# Patient Record
Sex: FEMALE | Race: WHITE | NOT HISPANIC OR LATINO | Employment: FULL TIME | URBAN - METROPOLITAN AREA
[De-identification: names, ages, dates, MRNs, and addresses within clinical notes are randomized per-mention and may not be internally consistent; named-entity substitution may affect disease eponyms.]

---

## 2019-11-06 ENCOUNTER — TRANSCRIBE ORDERS (OUTPATIENT)
Dept: ADMINISTRATIVE | Facility: HOSPITAL | Age: 34
End: 2019-11-06

## 2019-11-06 DIAGNOSIS — N64.52 NIPPLE DISCHARGE: Primary | ICD-10-CM

## 2019-11-08 ENCOUNTER — TRANSCRIBE ORDERS (OUTPATIENT)
Dept: ADMINISTRATIVE | Facility: HOSPITAL | Age: 34
End: 2019-11-08

## 2019-11-08 DIAGNOSIS — N64.52 NIPPLE DISCHARGE: Primary | ICD-10-CM

## 2019-11-13 ENCOUNTER — HOSPITAL ENCOUNTER (OUTPATIENT)
Dept: ULTRASOUND IMAGING | Facility: HOSPITAL | Age: 34
Discharge: HOME | End: 2019-11-13

## 2019-11-13 ENCOUNTER — HOSPITAL ENCOUNTER (OUTPATIENT)
Dept: MAMMOGRAPHY | Facility: HOSPITAL | Age: 34
Discharge: HOME | End: 2019-11-13
Admitting: SURGERY

## 2019-11-13 ENCOUNTER — TRANSCRIBE ORDERS (OUTPATIENT)
Dept: MAMMOGRAPHY | Facility: HOSPITAL | Age: 34
End: 2019-11-13

## 2019-11-13 DIAGNOSIS — N64.52 NIPPLE DISCHARGE: ICD-10-CM

## 2019-11-13 DIAGNOSIS — R92.8 ABNORMAL MAMMOGRAM: Primary | ICD-10-CM

## 2019-11-13 PROCEDURE — 77062 BREAST TOMOSYNTHESIS BI: CPT | Performed by: RADIOLOGY

## 2019-11-13 PROCEDURE — 76642 ULTRASOUND BREAST LIMITED: CPT | Performed by: RADIOLOGY

## 2019-11-13 PROCEDURE — 76642 ULTRASOUND BREAST LIMITED: CPT

## 2019-11-13 PROCEDURE — 77066 DX MAMMO INCL CAD BI: CPT | Performed by: RADIOLOGY

## 2019-11-13 PROCEDURE — 77066 DX MAMMO INCL CAD BI: CPT

## 2019-11-13 PROCEDURE — G0279 TOMOSYNTHESIS, MAMMO: HCPCS

## 2020-06-29 ENCOUNTER — TRANSCRIBE ORDERS (OUTPATIENT)
Dept: ADMINISTRATIVE | Facility: HOSPITAL | Age: 35
End: 2020-06-29

## 2020-06-29 DIAGNOSIS — R10.12 LUQ ABDOMINAL PAIN: Primary | ICD-10-CM

## 2020-07-08 ENCOUNTER — HOSPITAL ENCOUNTER (OUTPATIENT)
Dept: ULTRASOUND IMAGING | Facility: HOSPITAL | Age: 35
Discharge: HOME | End: 2020-07-08
Admitting: STUDENT IN AN ORGANIZED HEALTH CARE EDUCATION/TRAINING PROGRAM

## 2020-07-08 DIAGNOSIS — R10.12 LUQ ABDOMINAL PAIN: ICD-10-CM

## 2020-07-08 PROCEDURE — 76700 US EXAM ABDOM COMPLETE: CPT

## 2020-07-16 ENCOUNTER — TRANSCRIBE ORDERS (OUTPATIENT)
Dept: ADMINISTRATIVE | Facility: HOSPITAL | Age: 35
End: 2020-07-16

## 2020-07-16 DIAGNOSIS — K82.8 SLUDGE IN GALLBLADDER: Primary | ICD-10-CM

## 2020-08-13 ENCOUNTER — HOSPITAL ENCOUNTER (OUTPATIENT)
Dept: NUCLEAR MEDICINE | Facility: HOSPITAL | Age: 35
Discharge: HOME | End: 2020-08-13

## 2020-08-13 VITALS — WEIGHT: 275 LBS

## 2020-08-13 DIAGNOSIS — K82.8 SLUDGE IN GALLBLADDER: ICD-10-CM

## 2020-08-13 PROCEDURE — 25010000002 SINCALIDE PER 5 MCG: Performed by: STUDENT IN AN ORGANIZED HEALTH CARE EDUCATION/TRAINING PROGRAM

## 2020-08-13 PROCEDURE — 0 TECHNETIUM TC 99M MEBROFENIN KIT: Performed by: STUDENT IN AN ORGANIZED HEALTH CARE EDUCATION/TRAINING PROGRAM

## 2020-08-13 PROCEDURE — A9537 TC99M MEBROFENIN: HCPCS | Performed by: STUDENT IN AN ORGANIZED HEALTH CARE EDUCATION/TRAINING PROGRAM

## 2020-08-13 PROCEDURE — 78227 HEPATOBIL SYST IMAGE W/DRUG: CPT

## 2020-08-13 RX ORDER — KIT FOR THE PREPARATION OF TECHNETIUM TC 99M MEBROFENIN 45 MG/10ML
1 INJECTION, POWDER, LYOPHILIZED, FOR SOLUTION INTRAVENOUS
Status: COMPLETED | OUTPATIENT
Start: 2020-08-13 | End: 2020-08-13

## 2020-08-13 RX ADMIN — SINCALIDE 2.5 MCG: 5 INJECTION, POWDER, LYOPHILIZED, FOR SOLUTION INTRAVENOUS at 11:09

## 2020-08-13 RX ADMIN — MEBROFENIN 1 DOSE: 45 INJECTION, POWDER, LYOPHILIZED, FOR SOLUTION INTRAVENOUS at 10:05

## 2021-01-04 ENCOUNTER — TRANSCRIBE ORDERS (OUTPATIENT)
Dept: ADMINISTRATIVE | Facility: HOSPITAL | Age: 36
End: 2021-01-04

## 2021-01-04 DIAGNOSIS — R51.9 SEVERE FRONTAL HEADACHES: Primary | ICD-10-CM

## 2021-01-15 ENCOUNTER — HOSPITAL ENCOUNTER (OUTPATIENT)
Dept: CT IMAGING | Facility: HOSPITAL | Age: 36
Discharge: HOME | End: 2021-01-15
Admitting: NURSE PRACTITIONER

## 2021-01-15 DIAGNOSIS — R51.9 SEVERE FRONTAL HEADACHES: ICD-10-CM

## 2021-01-15 PROCEDURE — 70486 CT MAXILLOFACIAL W/O DYE: CPT

## 2021-09-21 ENCOUNTER — TRANSCRIBE ORDERS (OUTPATIENT)
Dept: ADMINISTRATIVE | Facility: HOSPITAL | Age: 36
End: 2021-09-21

## 2021-09-21 DIAGNOSIS — R10.84 ABDOMINAL CRAMPING, GENERALIZED: Primary | ICD-10-CM

## 2021-10-22 ENCOUNTER — APPOINTMENT (OUTPATIENT)
Dept: ULTRASOUND IMAGING | Facility: HOSPITAL | Age: 36
End: 2021-10-22

## 2021-12-13 ENCOUNTER — HOSPITAL ENCOUNTER (OUTPATIENT)
Dept: ULTRASOUND IMAGING | Facility: HOSPITAL | Age: 36
Discharge: HOME | End: 2021-12-13
Admitting: NURSE PRACTITIONER

## 2021-12-13 DIAGNOSIS — R10.84 ABDOMINAL CRAMPING, GENERALIZED: ICD-10-CM

## 2021-12-13 PROCEDURE — 76700 US EXAM ABDOM COMPLETE: CPT

## 2022-06-13 NOTE — PROGRESS NOTES
[facility]  [address], [address]  [place], [place], [address] [phone]  [email or web address] Francis Hospital [facility]           OUTPATIENT CLINIC CONSULTATION NOTE    Patient care team:  Patient Care Team:  Lv Banerjee APRN as PCP - General (Family Medicine)  Yuri Lowry MD as Consulting Physician (Cardiology)    Requesting Provider and Reason for consultation: The patient is being seen today at the request of Isaiah Mcdaniel MD for palpitations, PVCs.     Subjective:   Chief complaint:   Chief Complaint   Patient presents with   • Palpitations   • PVC'S       HPI:    Lula Armenta is a 37 y.o. female.  Cardiac focused problem list:  1. PVCs  a. Status post RFA, 2012, [place]  b. Followed by [facility] cardiology till 2022  2. Cardiomegaly  3. Bipolar 1 disorder  4. Anxiety  5. Arthritis  6. Psoriasis  7. Overweight    Patient presents today for consultation.     PVCs for years.  Increased palpitations over the last couple months.  Not on prescription medicines for PVCs.  Feels like a strong heartbeat.  Sometimes Apple watch states that her heart rate is in the 40s.  No clear provocative situations.  When she walks her dog, she does not experience symptoms    Generally anxious.  Takes Ativan as needed but once a week.  Has panic attacks.    Possible IBS    Has psoriasis.  Possible psoriatic arthritis.    Does not exercise regularly    Does not get great sleep.  About 6 hours at night.  Woken up by child and cat intermittently.   has YOON on CPAP.  Snores some.  Sometimes well rested    Does not use caffeine, alcohol, tobacco products      Review of Systems:  As noted above in the HPI    PFSH:  Patient Active Problem List   Diagnosis   • PVC's (premature ventricular contractions)   • Anxiety   • Psoriasis         Current Outpatient Medications:   •  clotrimazole (LOTRIMIN) 1 % cream, 1 application As Needed., Disp: , Rfl:   •  Fluocinolone Acetonide Scalp 0.01 % oil, As Needed.,  "Disp: , Rfl:   •  LORazepam (ATIVAN) 1 MG tablet, 1 mg As Needed., Disp: , Rfl:   •  magnesium oxide (MAG-OX) 400 MG tablet, Take 1 tablet by mouth 2 (Two) Times a Day., Disp: 60 tablet, Rfl: 11    Allergies   Allergen Reactions   • Lamotrigine Palpitations   • Methylprednisolone Unknown (See Comments)   • Pseudoephedrine Palpitations       Social History     Socioeconomic History   • Marital status:    Tobacco Use   • Smoking status: Former Smoker     Quit date:      Years since quittin.4   • Smokeless tobacco: Never Used   Vaping Use   • Vaping Use: Never used   Substance and Sexual Activity   • Alcohol use: Never   • Drug use: Defer   • Sexual activity: Defer     Family History   Problem Relation Age of Onset   • No Known Problems Mother    • No Known Problems Father    • Breast cancer Maternal Grandmother 47   • Ovarian cancer Neg Hx          Objective:   Physical Exam:  /78 (BP Location: Left arm, Patient Position: Sitting)   Pulse 77   Ht 170.2 cm (67\")   Wt 127 kg (280 lb 6.4 oz)   SpO2 98%   BMI 43.92 kg/m²   CONSTITUTIONAL: No acute distress  RESPIRATORY: Normal effort. Clear to auscultation bilaterally without wheezing or rales  CARDIOVASCULAR: Regular rate and rhythm with normal S1 and S2. Without murmur.  No carotid bruit bilaterally  PERIPHERAL VASCULAR: Normal radial pulse.       Labs:  Labs reviewed by myself  No results found for: BUN, CREATININE, K, ALT, AST    No results found for: CHOL  No results found for: TRIG  No results found for: HDL  No results found for: LDL  No components found for: LDLDIRECTC    Diagnostic Data:      ECG 12 Lead    Date/Time: 2022 9:15 AM  Performed by: Yuri Lowry MD  Authorized by: Yuri Lowry MD   Previous ECG: no previous ECG available  Rhythm: sinus rhythm            Holter monitor 2021  · THE PREDOMINANT RHYTHM WAS SINUS WITH SINUS BRADYCARDIA AND SINUS   TACHYCARDIA.  RATES RANGE FROM 47/MIN /MIN.   · ATRIAL ECTOPY " INCLUDED RARE PREMATURE ATRIAL COMPLEXES AND ONE FOUR   BEAT ATRIAL RUN ON D2.  THE FASTEST RATE /MIN   · VENTRICULAR ECTOPY INCLUDED RARE PREMATURE VENTRICULAR COMPLEXES,   RARE VENTRICULAR COUPLETS, AND RARE EPISODES OF VENTRICULAR BIGEMINY AND   VENTRICULAR TRIGEMINY.     Holter monitor 1/04/2022  PVC burden 0.79%.  Events associated with isolated ectopy or sinus rhythm    TTE 9/17/2018  Normal RV size with normal RV systolic function.   Borderline dilated LV with normal LV systolic function (LVEF>55%).   No significant valvular stenosis or regurgitation.   No pericardial effusion.   The E/e' ratio is most consistent with 'a normal' left atrial pressure.     TTE 4/13/2021  · The left ventricular end-diastolic volume index (2D biplane method) is dilated for females (>61ml/m2)   · Left ventricular systolic function is normal, with estimated EF > 55%.   · The left ventricular wall motion is normal.   · The right ventricle is mildly dilated.   · The right ventricular systolic function is normal.   · There is trace tricuspid regurgitation.   · Although the spectral Doppler envelope of TR was not adequate for calculating the PAP, the estimated PAP based upon other 2D and Doppler features suggests that the PAP is 'probably normal or at most mildly elevated'.     Assessment and Plan:     Premature ventricular contractions  Anxiety  Poor sleep/sleep disturbances  Possible IBS  Psoriasis  Obesity  -Patient with multiple factors that may be contributing to increased palpitations/PVCs including anxiety, sleep disturbances, possible IBS, psoriasis, lack of cardiac exercise/overweight  -Discussed trying to modify some of those factors.  Discussed a sleep medicine referral if she wishes.  -7-day heart monitor to quantify PVC burden due to increased frequency/symptoms recently  -Trial of magnesium oxide 400 mg twice daily  -Continue Ativan as needed  -Discussed diltiazem as needed, but will hold off on prescription for  now      - Return in about 6 months (around 12/16/2022).    Yuri Lowry MD, MSc, FACC, [facility]  Interventional Cardiology  [facility]

## 2022-06-16 ENCOUNTER — OFFICE VISIT (OUTPATIENT)
Dept: CARDIOLOGY | Facility: CLINIC | Age: 37
End: 2022-06-16

## 2022-06-16 VITALS
BODY MASS INDEX: 44.01 KG/M2 | DIASTOLIC BLOOD PRESSURE: 78 MMHG | OXYGEN SATURATION: 98 % | HEART RATE: 77 BPM | WEIGHT: 280.4 LBS | SYSTOLIC BLOOD PRESSURE: 122 MMHG | HEIGHT: 67 IN

## 2022-06-16 DIAGNOSIS — I49.3 PVC'S (PREMATURE VENTRICULAR CONTRACTIONS): Primary | ICD-10-CM

## 2022-06-16 PROCEDURE — 99204 OFFICE O/P NEW MOD 45 MIN: CPT | Performed by: INTERNAL MEDICINE

## 2022-06-16 PROCEDURE — 93000 ELECTROCARDIOGRAM COMPLETE: CPT | Performed by: INTERNAL MEDICINE

## 2022-06-16 RX ORDER — MAGNESIUM OXIDE 400 MG/1
400 TABLET ORAL 2 TIMES DAILY
Qty: 60 TABLET | Refills: 11
Start: 2022-06-16 | End: 2023-01-16

## 2022-06-16 RX ORDER — FLUOCINOLONE ACETONIDE 0.11 MG/ML
OIL TOPICAL AS NEEDED
COMMUNITY
Start: 2022-04-01 | End: 2023-01-16

## 2022-07-19 ENCOUNTER — TELEPHONE (OUTPATIENT)
Dept: CARDIOLOGY | Facility: CLINIC | Age: 37
End: 2022-07-19

## 2022-07-19 NOTE — TELEPHONE ENCOUNTER
Paula from [facility] was inquiring about patient having congestive heart failure. They would like to treat patient with Humira or Enbrel. They would like cardiology's input about these medications with patient's history of Cardiomegly.  Please Advise

## 2022-07-20 NOTE — TELEPHONE ENCOUNTER
Contacted Paula and gave updates from Dr. Lowry.    Yuri Lowry MD Kincaid, Shannon N, RN  Caller: Unspecified (Yesterday,  9:40 AM)  No definitive history of congestive heart failure.  Just reported cardiomegaly.  Okay to proceed with Humira or Enbrel from my standpoint.  The standard/typical cardiac risk with these medications apply to this patient.

## 2022-08-26 ENCOUNTER — TELEPHONE (OUTPATIENT)
Dept: CARDIOLOGY | Facility: CLINIC | Age: 37
End: 2022-08-26

## 2022-08-26 NOTE — TELEPHONE ENCOUNTER
----- Message from Yuri Lowry MD sent at 8/25/2022  8:06 PM EDT -----  Please let the patient know that her heart monitor looked okay.  No significant arrhythmia.  Rare PVCs.  Patient events associated with sinus rhythm/sinus tachycardia.    ----- Message -----  From: Yuri Lowry MD  Sent: 8/25/2022   6:20 PM EDT  To: Yuri Lowry MD

## 2023-01-16 ENCOUNTER — OFFICE VISIT (OUTPATIENT)
Dept: GASTROENTEROLOGY | Facility: CLINIC | Age: 38
End: 2023-01-16
Payer: COMMERCIAL

## 2023-01-16 VITALS
HEIGHT: 67 IN | DIASTOLIC BLOOD PRESSURE: 84 MMHG | WEIGHT: 278.4 LBS | OXYGEN SATURATION: 97 % | BODY MASS INDEX: 43.7 KG/M2 | HEART RATE: 71 BPM | SYSTOLIC BLOOD PRESSURE: 126 MMHG | TEMPERATURE: 97.1 F

## 2023-01-16 DIAGNOSIS — R74.8 ELEVATED LIVER ENZYMES: Primary | ICD-10-CM

## 2023-01-16 PROCEDURE — 99214 OFFICE O/P EST MOD 30 MIN: CPT | Performed by: NURSE PRACTITIONER

## 2023-01-16 RX ORDER — ADALIMUMAB 40MG/0.4ML
KIT SUBCUTANEOUS
COMMUNITY
Start: 2022-11-29 | End: 2023-01-16

## 2023-01-16 RX ORDER — MEDROXYPROGESTERONE ACETATE 150 MG/ML
1 INJECTION, SUSPENSION INTRAMUSCULAR
COMMUNITY
Start: 2023-01-11

## 2023-01-16 RX ORDER — PREGABALIN 75 MG/1
CAPSULE ORAL
COMMUNITY
Start: 2023-01-04 | End: 2023-01-16

## 2023-01-16 NOTE — PROGRESS NOTES
GASTROENTEROLOGY OFFICE NOTE    Lula Armenta  [id number]  1985    CARE TEAM  Patient Care Team:  Lv Banerjee APRN as PCP - General (Family Medicine)  Yuri Lowry MD as Consulting Physician (Cardiology)    Referring Provider: Michelle Dunn APRN    Chief Complaint   Patient presents with   • Elevated Hepatic Enzymes        HISTORY OF PRESENT ILLNESS:   Lula Armenta is a 37 y.o. female who presents to the clinic today as a referral from Aditi ZELAYA for evaluation regarding elevated liver enzymes.  [facility] is [facility] at [facility] due to joint pain, arthritis with history of taking Humira, previously been prescribed meloxicam.  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      Review of referral documentation revealed 7/2022 she had positive EDWARD 1-160 speckled pattern.    7/18/2022 CMP with normal ALT of 25, normal AST 18 and bilirubin less than 0.2, alkaline phosphatase normal 98.  Acute hepatitis panel negative    12/5/2022 TSH normal, sed rate normal 13, CMP with elevated  and elevated AST 64.  Bilirubin normal 0.3 alkaline phosphatase normal 90.  CBC normal white blood cell count 10.1, hemoglobin normal 13.3, hematocrit normal 40.7 and platelet count normal 306.    3/28/2022 CMP revealed normal liver enzymes.    12/13/2021 ultrasound of the abdomen Due to abdominal pain, nausea, vomiting revealed increased echogenicity throughout the hepatic parenchyma apart from focal fatty sparing, no focal liver lesion.    An ultrasound of the liver has been ordered by her primary care provider but not scheduled.    Humira was reportedly discontinued due to elevated liver enzymes but patient reports when  "liver enzymes were rechecked 5 weeks after stopping Humira liver enzymes remain elevated.    She takes ibuprofen 600 to 800 mg twice daily.    She reports she has history of significant intentional weight loss but after pregnancy 4 to 5 years ago she had weight gain.  She has lost some weight since that time and continues to work on weight loss.    She reports occasional use of a liquid CBD product.  Past Medical History:   Diagnosis Date   • Anxiety    • Arthritis    • Bipolar 1 disorder (HCC)    • History of cardiac radiofrequency ablation         Past Surgical History:   Procedure Laterality Date   •  SECTION     • UPPER GASTROINTESTINAL ENDOSCOPY      [facility]        Current Outpatient Medications on File Prior to Visit   Medication Sig   • Etanercept (Enbrel SureClick) 50 MG/ML solution auto-injector Inject 1 mL under the skin into the appropriate area as directed. Waiting on insurance approval   • LORazepam (ATIVAN) 1 MG tablet 1 mg As Needed.     No current facility-administered medications on file prior to visit.       Allergies   Allergen Reactions   • Lidocaine Unknown - Low Severity   • Lamotrigine Palpitations   • Methylprednisolone Unknown (See Comments)   • Pseudoephedrine Palpitations       Family History   Problem Relation Age of Onset   • No Known Problems Mother    • No Known Problems Father    • Breast cancer Maternal Grandmother 47   • Ovarian cancer Neg Hx        Social History     Socioeconomic History   • Marital status:    Tobacco Use   • Smoking status: Former     Types: Cigarettes     Quit date:      Years since quittin.0   • Smokeless tobacco: Never   Vaping Use   • Vaping Use: Never used   Substance and Sexual Activity   • Alcohol use: Never   • Drug use: Defer   • Sexual activity: Defer       PHYSICAL EXAM   /84 (BP Location: Left arm, Patient Position: Sitting, Cuff Size: Adult)   Pulse 71   Temp 97.1 °F (36.2 °C) (Infrared)   Ht 170.2 cm (67\")   Wt 126 " kg (278 lb 6.4 oz)   SpO2 97%   BMI 43.60 kg/m²   Physical Exam  Constitutional:       General: She is not in acute distress.     Appearance: She is not toxic-appearing.   HENT:      Head: Normocephalic and atraumatic. No contusion.      Right Ear: External ear normal.      Left Ear: External ear normal.   Eyes:      General: Lids are normal. No scleral icterus.        Right eye: No discharge.         Left eye: No discharge.      Extraocular Movements: Extraocular movements intact.   Neck:      Trachea: Trachea normal.      Comments: No visible mass  No visible adenopathy  Cardiovascular:      Rate and Rhythm: Normal rate.   Pulmonary:      Effort: No respiratory distress.      Comments: Symmetrical expansion    Musculoskeletal:      Right lower leg: No edema.      Left lower leg: No edema.      Comments: Symmetrical movement of upper extremities  Symmetrical movement of lower extremities  No visible deformities   Skin:     General: Skin is warm and dry.      Coloration: Skin is not jaundiced.   Neurological:      General: No focal deficit present.      Mental Status: She is alert and oriented to person, place, and time.   Psychiatric:         Mood and Affect: Mood normal.         Behavior: Behavior normal.         Thought Content: Thought content normal.     Results Review:   7/2022 she had positive EDWARD 1-160 speckled pattern.    7/18/2022 CMP with normal ALT of 25, normal AST 18 and bilirubin less than 0.2, alkaline phosphatase normal 98.  Acute hepatitis panel negative    12/5/2022 TSH normal, sed rate normal 13, CMP with elevated  and elevated AST 64.  Bilirubin normal 0.3 alkaline phosphatase normal 90.  CBC normal white blood cell count 10.1, hemoglobin normal 13.3, hematocrit normal 40.7 and platelet count normal 306.    3/28/2022 CMP revealed normal liver enzymes.    12/13/2021 ultrasound of the abdomen Due to abdominal pain, nausea, vomiting revealed increased echogenicity throughout the hepatic  parenchyma apart from focal fatty sparing, no focal liver lesion.  ASSESSMENT / PLAN  1. Elevated liver enzymes  - at this time, do not believe Humira is cause nor contributing to elevated liver enzymes.  Review of information on NIH liver tox reveals Humira has been linked to rare instances of idiosyncratic acute liver injury and is a potential cause of reactivation of hepatitis B.  She previously had negative acute hepatitis panel.  Humira could be restarted by [facility]. We discussed possibly trying to achieve/see normal liver enzymes before she starts a new or different medication as she just had normal liver enzymes 6 to 7 months ago but if she has uncontrolled symptoms off medication and is unable to work or do things she needs to do daily, recommend she restart medication.   -  I would like for her to decrease use of ibuprofen.  She may take acetaminophen as needed for pain.  - she reported Enbrel is being considered for treatment at [facility].  Review of information regarding Enbrel on NIH liver tox reveals it has been linked to rare instances of acute, clinically apparent liver injury.  If any new or different medications are initiated, recommend close monitoring of liver enzymes either at our office or at office where medication is being prescribed  -Continue to avoid alcohol  -Fatty liver could be contributing to elevated liver enzymes for which we discussed continued effort at weight loss  - will send for autoimmune and metabolic workup for elevated liver enzymes as below. She previously has negative viral hepatitis panel. She previously had positive EDWARD as above.   - I would hold on repeat US at this time. She had prior US in 2020 and 2021    - Tissue Transglutaminase, IgA; Future  - Protime-INR; Future  - GOODWIN Fibrosure; Future  - Mitochondrial Antibodies, M2; Future  - Iron Profile; Future  - IgG, IgA, IgM; Future  - Hepatitis B Surface Antibody; Future  -  Hepatitis A Antibody, Total; Future  - Hemochromatosis Mutation; Future  - Ferritin; Future  - Comprehensive Metabolic Panel; Future  - Ceruloplasmin; Future  - CBC (No Diff); Future  - Anti-Smooth Muscle Antibody Titer; Future  - Anti-microsomal Antibody; Future  - Alpha - 1 - Antitrypsin Phenotype; Future      Return in about 4 weeks (around 2/13/2023).    Tamela Overton, APRN  01/16/2023    ADDENDUM MULU Overton, GARCIA 2/3/2023: EGD 9/23/2020 per Dr. Perea due to reflux, left upper quadrant abdominal pain and chronic diarrhea appeared normal.  Recommendations to continue omeprazole every morning for reflux which she has improved, try amitriptyline 25 mg at bedtime for pain to see if helpful over the next month as it can help gastrointestinal and musculoskeletal pain.

## 2023-01-18 ENCOUNTER — LAB (OUTPATIENT)
Dept: LAB | Facility: HOSPITAL | Age: 38
End: 2023-01-18
Payer: COMMERCIAL

## 2023-01-18 DIAGNOSIS — R74.8 ELEVATED LIVER ENZYMES: ICD-10-CM

## 2023-01-18 LAB
ALBUMIN SERPL-MCNC: 3.6 G/DL (ref 3.5–5.2)
ALBUMIN/GLOB SERPL: 1 G/DL
ALP SERPL-CCNC: 81 U/L (ref 39–117)
ALT SERPL W P-5'-P-CCNC: 85 U/L (ref 1–33)
ANION GAP SERPL CALCULATED.3IONS-SCNC: 9.4 MMOL/L (ref 5–15)
AST SERPL-CCNC: 66 U/L (ref 1–32)
BILIRUB SERPL-MCNC: <0.2 MG/DL (ref 0–1.2)
BUN SERPL-MCNC: 14 MG/DL (ref 6–20)
BUN/CREAT SERPL: 18.9 (ref 7–25)
CALCIUM SPEC-SCNC: 9.1 MG/DL (ref 8.6–10.5)
CERULOPLASMIN SERPL-MCNC: 27 MG/DL (ref 19–39)
CHLORIDE SERPL-SCNC: 107 MMOL/L (ref 98–107)
CO2 SERPL-SCNC: 23.6 MMOL/L (ref 22–29)
CREAT SERPL-MCNC: 0.74 MG/DL (ref 0.57–1)
DEPRECATED RDW RBC AUTO: 41.9 FL (ref 37–54)
EGFRCR SERPLBLD CKD-EPI 2021: 108.4 ML/MIN/1.73
ERYTHROCYTE [DISTWIDTH] IN BLOOD BY AUTOMATED COUNT: 13.3 % (ref 12.3–15.4)
FERRITIN SERPL-MCNC: 74.9 NG/ML (ref 13–150)
GLOBULIN UR ELPH-MCNC: 3.7 GM/DL
GLUCOSE SERPL-MCNC: 105 MG/DL (ref 65–99)
HBV SURFACE AB SER RIA-ACNC: REACTIVE
HCT VFR BLD AUTO: 38.1 % (ref 34–46.6)
HGB BLD-MCNC: 13 G/DL (ref 12–15.9)
IGA1 MFR SER: 295 MG/DL (ref 70–400)
IGG1 SER-MCNC: 1548 MG/DL (ref 700–1600)
IGM SERPL-MCNC: 83 MG/DL (ref 40–230)
INR PPP: 1.01 (ref 0.84–1.13)
IRON 24H UR-MRATE: 32 MCG/DL (ref 37–145)
IRON SATN MFR SERPL: 7 % (ref 20–50)
MCH RBC QN AUTO: 29.3 PG (ref 26.6–33)
MCHC RBC AUTO-ENTMCNC: 34.1 G/DL (ref 31.5–35.7)
MCV RBC AUTO: 86 FL (ref 79–97)
PLATELET # BLD AUTO: 319 10*3/MM3 (ref 140–450)
PMV BLD AUTO: 9.7 FL (ref 6–12)
POTASSIUM SERPL-SCNC: 4.1 MMOL/L (ref 3.5–5.2)
PROT SERPL-MCNC: 7.3 G/DL (ref 6–8.5)
PROTHROMBIN TIME: 13.2 SECONDS (ref 11.4–14.4)
RBC # BLD AUTO: 4.43 10*6/MM3 (ref 3.77–5.28)
SODIUM SERPL-SCNC: 140 MMOL/L (ref 136–145)
TIBC SERPL-MCNC: 431 MCG/DL (ref 298–536)
TRANSFERRIN SERPL-MCNC: 289 MG/DL (ref 200–360)
WBC NRBC COR # BLD: 5.45 10*3/MM3 (ref 3.4–10.8)

## 2023-01-18 PROCEDURE — 84466 ASSAY OF TRANSFERRIN: CPT

## 2023-01-18 PROCEDURE — 83540 ASSAY OF IRON: CPT

## 2023-01-18 PROCEDURE — 86708 HEPATITIS A ANTIBODY: CPT

## 2023-01-18 PROCEDURE — 82465 ASSAY BLD/SERUM CHOLESTEROL: CPT

## 2023-01-18 PROCEDURE — 81256 HFE GENE: CPT

## 2023-01-18 PROCEDURE — 86381 MITOCHONDRIAL ANTIBODY EACH: CPT

## 2023-01-18 PROCEDURE — 82728 ASSAY OF FERRITIN: CPT

## 2023-01-18 PROCEDURE — 82104 ALPHA-1-ANTITRYPSIN PHENO: CPT

## 2023-01-18 PROCEDURE — 85027 COMPLETE CBC AUTOMATED: CPT

## 2023-01-18 PROCEDURE — 84478 ASSAY OF TRIGLYCERIDES: CPT

## 2023-01-18 PROCEDURE — 86364 TISS TRNSGLTMNASE EA IG CLAS: CPT

## 2023-01-18 PROCEDURE — 82784 ASSAY IGA/IGD/IGG/IGM EACH: CPT

## 2023-01-18 PROCEDURE — 82977 ASSAY OF GGT: CPT

## 2023-01-18 PROCEDURE — 83883 ASSAY NEPHELOMETRY NOT SPEC: CPT

## 2023-01-18 PROCEDURE — 86376 MICROSOMAL ANTIBODY EACH: CPT

## 2023-01-18 PROCEDURE — 85610 PROTHROMBIN TIME: CPT

## 2023-01-18 PROCEDURE — 80053 COMPREHEN METABOLIC PANEL: CPT

## 2023-01-18 PROCEDURE — 86015 ACTIN ANTIBODY EACH: CPT

## 2023-01-18 PROCEDURE — 86706 HEP B SURFACE ANTIBODY: CPT

## 2023-01-18 PROCEDURE — 82390 ASSAY OF CERULOPLASMIN: CPT

## 2023-01-18 PROCEDURE — 82103 ALPHA-1-ANTITRYPSIN TOTAL: CPT

## 2023-01-18 PROCEDURE — 83010 ASSAY OF HAPTOGLOBIN QUANT: CPT

## 2023-01-18 PROCEDURE — 82172 ASSAY OF APOLIPOPROTEIN: CPT

## 2023-01-19 ENCOUNTER — PATIENT MESSAGE (OUTPATIENT)
Dept: GASTROENTEROLOGY | Facility: CLINIC | Age: 38
End: 2023-01-19
Payer: COMMERCIAL

## 2023-01-19 LAB
HAV AB SER QL IA: NEGATIVE
LKM-1 AB SER-ACNC: 2.2 UNITS (ref 0–20)
MITOCHONDRIA M2 IGG SER-ACNC: <20 UNITS (ref 0–20)
SMA IGG SER-ACNC: 24 UNITS (ref 0–19)
TTG IGA SER-ACNC: <2 U/ML (ref 0–3)

## 2023-01-20 LAB
A2 MACROGLOB SERPL-MCNC: 116 MG/DL (ref 110–276)
ALT SERPL W P-5'-P-CCNC: 101 IU/L (ref 0–40)
APO A-I SERPL-MCNC: 99 MG/DL (ref 116–209)
AST SERPL W P-5'-P-CCNC: 76 IU/L (ref 0–40)
BILIRUB SERPL-MCNC: 0.1 MG/DL (ref 0–1.2)
CHOLEST SERPL-MCNC: 130 MG/DL (ref 100–199)
FIBROSIS SCORING:: ABNORMAL
FIBROSIS STAGE SERPL QL: ABNORMAL
GGT SERPL-CCNC: 12 IU/L (ref 0–60)
GLUCOSE SERPL-MCNC: 115 MG/DL (ref 70–99)
HAPTOGLOB SERPL-MCNC: 174 MG/DL (ref 33–278)
INTERPRETATIONS: (REFERENCE): ABNORMAL
LABORATORY COMMENT REPORT: ABNORMAL
LIVER FIBR SCORE SERPL CALC.FIBROSURE: 0.01 (ref 0–0.21)
NASH SCORING (REFERENCE): ABNORMAL
NECROINFLAMMATORY ACT GRADE SERPL QL: ABNORMAL
NECROINFLAMMATORY ACT SCORE SERPL: 0.5
SERVICE CMNT-IMP: ABNORMAL
STEATOSIS GRADE (REFERENCE): ABNORMAL
STEATOSIS GRADING (REFERENCE): ABNORMAL
STEATOSIS SCORE (REFERENCE): 0.74 (ref 0–0.3)
TRIGL SERPL-MCNC: 106 MG/DL (ref 0–149)

## 2023-01-24 LAB — HFE GENE MUT ANL BLD/T: NORMAL

## 2023-01-25 LAB
A1AT PHENOTYP SERPL IFE: NORMAL
A1AT SERPL-MCNC: 162 MG/DL (ref 100–188)

## 2023-02-16 ENCOUNTER — OFFICE VISIT (OUTPATIENT)
Dept: GASTROENTEROLOGY | Facility: CLINIC | Age: 38
End: 2023-02-16
Payer: COMMERCIAL

## 2023-02-16 VITALS
DIASTOLIC BLOOD PRESSURE: 84 MMHG | HEIGHT: 67 IN | OXYGEN SATURATION: 96 % | BODY MASS INDEX: 42.97 KG/M2 | SYSTOLIC BLOOD PRESSURE: 120 MMHG | TEMPERATURE: 97.3 F | WEIGHT: 273.8 LBS | HEART RATE: 85 BPM

## 2023-02-16 DIAGNOSIS — Z78.9 IMMUNE TO HEPATITIS B: ICD-10-CM

## 2023-02-16 DIAGNOSIS — R74.8 ELEVATED LIVER ENZYMES: Primary | ICD-10-CM

## 2023-02-16 DIAGNOSIS — R10.9 RIGHT SIDED ABDOMINAL PAIN: ICD-10-CM

## 2023-02-16 DIAGNOSIS — R89.9 ABNORMAL LABORATORY TEST RESULT: ICD-10-CM

## 2023-02-16 PROCEDURE — 99214 OFFICE O/P EST MOD 30 MIN: CPT | Performed by: NURSE PRACTITIONER

## 2023-02-16 RX ORDER — LEVOCETIRIZINE DIHYDROCHLORIDE 5 MG/1
TABLET, FILM COATED ORAL EVERY 24 HOURS
COMMUNITY

## 2023-02-16 RX ORDER — AZELASTINE 1 MG/ML
SPRAY, METERED NASAL EVERY 12 HOURS SCHEDULED
COMMUNITY

## 2023-02-16 NOTE — PROGRESS NOTES
GASTROENTEROLOGY OFFICE NOTE    Lula Armenta  [id number]  1985    CARE TEAM  Patient Care Team:  Lv Banerjee APRN as PCP - General (Family Medicine)  Yuri Lowry MD as Consulting Physician (Cardiology)    Referring Provider: No ref. provider found    Chief Complaint   Patient presents with   • Elevated Hepatic Enzymes        HISTORY OF PRESENT ILLNESS:   Lula Armenta is a 38 y.o. female Who returns for follow-up regarding elevated liver enzymes. 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     She had labs drawn after her last office visit as below with weakly positive anti-smooth muscle antibody titer that can be found in autoimmune hepatitis or primary biliary cholangitis, CMP with elevated ALT and AST, normal alkaline phosphatase; immune to hepatitis B but not immune to hepatitis A, iron profile with low iron, low iron saturation with recommendation to start multivitamin with iron daily which she is taking.  GOODWIN fibrosure revealed F0, S3 and N1.  Elevated liver enzymes likely due to GOODWIN.  She is working on weight loss.  She has lost approximately 5 pounds over the past month.    She started Enbrel recently and reports vision changes for which she has an appointment with an eye specialist later today.  She feels as though swelling in her hands and pain in general has improved with first dose of Enbrel but expresses concern regarding changes in vision.    She also reports right mid back pain radiating around to her right side and under her right rib  Past Medical History:   Diagnosis Date   • Anxiety    • Arthritis    • Bigeminy     bigeminy tachycardia   • Bipolar 1 disorder (HCC)    • Cervical dysplasia    • Eczema    • History  "of cardiac radiofrequency ablation    • GOODWIN (nonalcoholic steatohepatitis)    • PCOS (polycystic ovarian syndrome)    • Plantar fasciitis    • Psoriatic arthritis (HCC)    • Seborrheic keratosis         Past Surgical History:   Procedure Laterality Date   •  SECTION     • UPPER GASTROINTESTINAL ENDOSCOPY      [facility]        Current Outpatient Medications on File Prior to Visit   Medication Sig   • azelastine (ASTELIN) 0.1 % nasal spray Every 12 (Twelve) Hours.   • Etanercept (Enbrel SureClick) 50 MG/ML solution auto-injector Inject 1 mL under the skin into the appropriate area as directed. Waiting on insurance approval   • levocetirizine (XYZAL) 5 MG tablet Daily.   • LORazepam (ATIVAN) 1 MG tablet 1 mg As Needed.     No current facility-administered medications on file prior to visit.       Allergies   Allergen Reactions   • Lidocaine Unknown - Low Severity   • Lamotrigine Palpitations   • Methylprednisolone Unknown (See Comments)   • Pseudoephedrine Palpitations       Family History   Problem Relation Age of Onset   • No Known Problems Mother    • No Known Problems Father    • Breast cancer Maternal Grandmother 47   • Ovarian cancer Neg Hx        Social History     Socioeconomic History   • Marital status:    Tobacco Use   • Smoking status: Former     Types: Cigarettes     Quit date:      Years since quittin.   • Smokeless tobacco: Never   Vaping Use   • Vaping Use: Never used   Substance and Sexual Activity   • Alcohol use: Never   • Drug use: Defer   • Sexual activity: Defer       PHYSICAL EXAM   /84 (BP Location: Left arm, Patient Position: Sitting, Cuff Size: Adult)   Pulse 85   Temp 97.3 °F (36.3 °C) (Infrared)   Ht 170.2 cm (67\")   Wt 124 kg (273 lb 12.8 oz)   SpO2 96%   BMI 42.88 kg/m²   Physical Exam  Constitutional:       General: She is not in acute distress.     Appearance: She is not toxic-appearing.   HENT:      Head: Normocephalic and atraumatic. No contusion. "      Right Ear: External ear normal.      Left Ear: External ear normal.   Eyes:      General: Lids are normal. No scleral icterus.        Right eye: No discharge.         Left eye: No discharge.      Extraocular Movements: Extraocular movements intact.   Neck:      Trachea: Trachea normal.      Comments: No visible mass  No visible adenopathy  Cardiovascular:      Rate and Rhythm: Normal rate.   Pulmonary:      Effort: No respiratory distress.      Comments: Symmetrical expansion    Abdominal:      Palpations: Abdomen is soft. There is no mass.      Tenderness: There is abdominal tenderness in the right upper quadrant.   Musculoskeletal:      Right lower leg: No edema.      Left lower leg: No edema.      Comments: Symmetrical movement of upper extremities  Symmetrical movement of lower extremities  No visible deformities   Skin:     General: Skin is warm and dry.      Coloration: Skin is not jaundiced.   Neurological:      General: No focal deficit present.      Mental Status: She is alert and oriented to person, place, and time.   Psychiatric:         Mood and Affect: Mood normal.         Behavior: Behavior normal.         Thought Content: Thought content normal.     Results Review:  EGD 9/23/2020 per Dr. Perea due to reflux, left upper quadrant abdominal pain and chronic diarrhea appeared normal.  Recommendations to continue omeprazole every morning for reflux which she has improved, try amitriptyline 25 mg at bedtime for pain to see if helpful over the next month as it can help gastrointestinal and musculoskeletal pain.    12/13/2021 ultrasound of the abdomen Due to abdominal pain, nausea, vomiting revealed increased echogenicity throughout the hepatic parenchyma apart from focal fatty sparing, no focal liver lesion.    3/28/2022 CMP revealed normal liver enzymes.     7/2022 she had positive EDWARD 1-160 speckled pattern.    7/18/2022 CMP with normal ALT of 25, normal AST 18 and bilirubin less than 0.2, alkaline  phosphatase normal 98.  Acute hepatitis panel negative     12/5/2022 TSH normal, sed rate normal 13, CMP with elevated  and elevated AST 64.  Bilirubin normal 0.3 alkaline phosphatase normal 90.  CBC normal white blood cell count 10.1, hemoglobin normal 13.3, hematocrit normal 40.7 and platelet count normal 306.     1/18/2023    - Tissue Transglutaminase, IgA less than 2  - GOODWIN Fibrosure F0 no fibrosis, S3 marked or severe steatosis, N1 borderline or probable GOODWIN  - Mitochondrial Antibodies negative  - Iron Profile low iron and iron saturation, normal ferritin, normal CBC  - IgG, IgA, IgM normal  - Hepatitis B Surface Antibody reacitve, immune  - Hepatitis A Antibody negative needs vaccine  - Hemochromatosis Mutation C282Y, H63D, S65C not detected  - Ferritin 74.90  - Ceruloplasmin normal 27  - Anti-Smooth Muscle Antibody Titer weakly positive 24  - Anti-microsomal Antibody negative  - Alpha - 1 - Antitrypsin Phenotype total 162, phenotype MM    CMP    CMP 1/18/23   Glucose 105 (A)   BUN 14   Creatinine 0.74   eGFR 108.4   Sodium 140   Potassium 4.1   Chloride 107   Calcium 9.1   Total Protein 7.3   Albumin 3.6   Globulin 3.7   Total Bilirubin <0.2   Alkaline Phosphatase 81   AST (SGOT) 66 (A)   ALT (SGPT) 85 (A)   Albumin/Globulin Ratio 1.0   BUN/Creatinine Ratio 18.9   Anion Gap 9.4   (A) Abnormal value            CBC    CBC 1/18/23   WBC 5.45   RBC 4.43   Hemoglobin 13.0   Hematocrit 38.1   MCV 86.0   MCH 29.3   MCHC 34.1   RDW 13.3   Platelets 319           Iron   Date Value Ref Range Status   01/18/2023 32 (L) 37 - 145 mcg/dL Final     Iron Saturation   Date Value Ref Range Status   01/18/2023 7 (L) 20 - 50 % Final     Transferrin   Date Value Ref Range Status   01/18/2023 289 200 - 360 mg/dL Final     TIBC   Date Value Ref Range Status   01/18/2023 431 298 - 536 mcg/dL Final     Ferritin   Date Value Ref Range Status   01/18/2023 74.90 13.00 - 150.00 ng/mL Final      IgG   Date Value Ref Range Status    01/18/2023 1,548 700 - 1,600 mg/dL Final     IgA   Date Value Ref Range Status   01/18/2023 295 70 - 400 mg/dL Final     IgM   Date Value Ref Range Status   01/18/2023 83 40 - 230 mg/dL Final        ASSESSMENT / PLAN  1. Elevated liver enzymes and abnormal laboratory result  -Elevated liver enzymes likely due to GOODWIN based off autoimmune, metabolic and viral work-up  - GOODWIN Fibrosure F0 no fibrosis, S3 marked or severe steatosis, N1 borderline or probable GOODWIN  -Anti-smooth muscle antibody weakly positive but with normal alkaline phosphatase do not suspect primary biliary cholangitis at this time; this can be found in autoimmune hepatitis ( she has history of positive EDWARD) or chronic active hepatitis.  Suspect GOODWIN to be reason for elevated liver enzymes.   -Continue to work on weight loss via dietary changes and exercise  -If liver enzymes increase or do not become normal with continued weight loss will likely recommend repeat ultrasound and liver biopsy for additional evaluation   -Avoid as many over-the-counter supplements as possible  -Work-up revealed low iron, low iron saturation; normal ferritin; normal hemoglobin and hematocrit, multivitamin with iron recommended which she is taking  -Limit use of ibuprofen, consider acetaminophen as needed for pain  -Continue to avoid alcohol  - Comprehensive Metabolic Panel; Standing (CMP can be checked once per month due to new medication and patient working on weight loss with dietary changes and exercise but she does not have to check CMP once monthly)  The following was previously documented: at this time, do not believe Humira is cause nor contributing to elevated liver enzymes.  Review of information on NIH liver tox reveals Humira has been linked to rare instances of idiosyncratic acute liver injury and is a potential cause of reactivation of hepatitis B.  She previously had negative acute hepatitis panel.  Humira could be restarted by [facility].  - she has recently start Enbrel.  Review of information regarding Enbrel on NIH liver tox reveals Enbrel has been linked to rare instances of acute, clinically apparent liver injury.  If any new or different medications are initiated, recommend close monitoring of liver enzymes either at our office or at office where medication is being prescribed (standing CMP order for once monthly due to possibly starting new medication)  2. Right sided abdominal pain  - suspect musculoskeletal pain as pain seems to originate in the back and radiates around to the right side  -Monitor at this time  -Avoid constipation    3. Immune to hepatitis B  -She is not immune to hepatitis A.  We discussed hepatitis a vaccine today but she did not want to proceed with hepatitis A vaccine at this time due to recently starting Enbrel.  She may discuss hepatitis A vaccine at her primary care provider's office, local health department, local pharmacy or return to our office for hepatitis A vaccine in the future.    Return in about 3 months (around 5/16/2023).    Tamela Overton, APRN  02/16/2023

## 2023-02-22 ENCOUNTER — LAB (OUTPATIENT)
Dept: LAB | Facility: HOSPITAL | Age: 38
End: 2023-02-22
Payer: COMMERCIAL

## 2023-02-22 DIAGNOSIS — R74.8 ELEVATED LIVER ENZYMES: ICD-10-CM

## 2023-02-22 LAB
ALBUMIN SERPL-MCNC: 3.9 G/DL (ref 3.5–5.2)
ALBUMIN/GLOB SERPL: 1 G/DL
ALP SERPL-CCNC: 81 U/L (ref 39–117)
ALT SERPL W P-5'-P-CCNC: 60 U/L (ref 1–33)
ANION GAP SERPL CALCULATED.3IONS-SCNC: 10.4 MMOL/L (ref 5–15)
AST SERPL-CCNC: 48 U/L (ref 1–32)
BILIRUB SERPL-MCNC: 0.3 MG/DL (ref 0–1.2)
BUN SERPL-MCNC: 12 MG/DL (ref 6–20)
BUN/CREAT SERPL: 15.8 (ref 7–25)
CALCIUM SPEC-SCNC: 9.5 MG/DL (ref 8.6–10.5)
CHLORIDE SERPL-SCNC: 105 MMOL/L (ref 98–107)
CO2 SERPL-SCNC: 26.6 MMOL/L (ref 22–29)
CREAT SERPL-MCNC: 0.76 MG/DL (ref 0.57–1)
EGFRCR SERPLBLD CKD-EPI 2021: 104.3 ML/MIN/1.73
GLOBULIN UR ELPH-MCNC: 4 GM/DL
GLUCOSE SERPL-MCNC: 100 MG/DL (ref 65–99)
POTASSIUM SERPL-SCNC: 4.1 MMOL/L (ref 3.5–5.2)
PROT SERPL-MCNC: 7.9 G/DL (ref 6–8.5)
SODIUM SERPL-SCNC: 142 MMOL/L (ref 136–145)

## 2023-02-22 PROCEDURE — 80053 COMPREHEN METABOLIC PANEL: CPT

## 2023-04-25 ENCOUNTER — LAB (OUTPATIENT)
Dept: LAB | Facility: HOSPITAL | Age: 38
End: 2023-04-25
Payer: COMMERCIAL

## 2023-04-25 DIAGNOSIS — R74.8 ELEVATED LIVER ENZYMES: ICD-10-CM

## 2023-04-25 LAB
ALBUMIN SERPL-MCNC: 4.1 G/DL (ref 3.5–5.2)
ALBUMIN/GLOB SERPL: 1.2 G/DL
ALP SERPL-CCNC: 78 U/L (ref 39–117)
ALT SERPL W P-5'-P-CCNC: 32 U/L (ref 1–33)
ANION GAP SERPL CALCULATED.3IONS-SCNC: 11.5 MMOL/L (ref 5–15)
AST SERPL-CCNC: 25 U/L (ref 1–32)
BILIRUB SERPL-MCNC: 0.3 MG/DL (ref 0–1.2)
BUN SERPL-MCNC: 10 MG/DL (ref 6–20)
BUN/CREAT SERPL: 13.9 (ref 7–25)
CALCIUM SPEC-SCNC: 9.2 MG/DL (ref 8.6–10.5)
CHLORIDE SERPL-SCNC: 104 MMOL/L (ref 98–107)
CO2 SERPL-SCNC: 26.5 MMOL/L (ref 22–29)
CREAT SERPL-MCNC: 0.72 MG/DL (ref 0.57–1)
EGFRCR SERPLBLD CKD-EPI 2021: 111.3 ML/MIN/1.73
GLOBULIN UR ELPH-MCNC: 3.4 GM/DL
GLUCOSE SERPL-MCNC: 86 MG/DL (ref 65–99)
POTASSIUM SERPL-SCNC: 3.9 MMOL/L (ref 3.5–5.2)
PROT SERPL-MCNC: 7.5 G/DL (ref 6–8.5)
SODIUM SERPL-SCNC: 142 MMOL/L (ref 136–145)

## 2023-04-25 PROCEDURE — 80053 COMPREHEN METABOLIC PANEL: CPT

## 2023-05-19 ENCOUNTER — OFFICE VISIT (OUTPATIENT)
Dept: GASTROENTEROLOGY | Facility: CLINIC | Age: 38
End: 2023-05-19
Payer: COMMERCIAL

## 2023-05-19 VITALS
WEIGHT: 278.4 LBS | OXYGEN SATURATION: 98 % | SYSTOLIC BLOOD PRESSURE: 132 MMHG | DIASTOLIC BLOOD PRESSURE: 88 MMHG | BODY MASS INDEX: 43.7 KG/M2 | HEART RATE: 75 BPM | HEIGHT: 67 IN | TEMPERATURE: 97 F

## 2023-05-19 DIAGNOSIS — E66.01 CLASS 3 SEVERE OBESITY WITH SERIOUS COMORBIDITY AND BODY MASS INDEX (BMI) OF 40.0 TO 44.9 IN ADULT, UNSPECIFIED OBESITY TYPE: ICD-10-CM

## 2023-05-19 DIAGNOSIS — Z78.9 IMMUNE TO HEPATITIS B: ICD-10-CM

## 2023-05-19 DIAGNOSIS — K58.9 IRRITABLE BOWEL SYNDROME, UNSPECIFIED TYPE: ICD-10-CM

## 2023-05-19 DIAGNOSIS — R89.9 ABNORMAL LABORATORY TEST RESULT: Primary | ICD-10-CM

## 2023-05-19 NOTE — PROGRESS NOTES
GASTROENTEROLOGY OFFICE NOTE    Lula Armenta  [id number]  1985    CARE TEAM  Patient Care Team:  Lv Banerjee APRN as PCP - General (Family Medicine)  Yuri Lowry MD as Consulting Physician (Cardiology)    Referring Provider: No ref. provider found    Chief Complaint   Patient presents with   • Elevated Hepatic Enzymes        HISTORY OF PRESENT ILLNESS:   Lula Armenta is a 38 y.o. female who returns for follow-up regarding elevated liver enzymes suspected to be due to GOODWIN.  She has lost approximately 15 pounds.  Most recent CMP as below with normal liver enzymes.    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     She has intermittent right mid back pain radiating to right side, under right rib, possibly musculoskeletal in nature.    She reports she had prior conversation with primary care about possible weight loss medication but did not know if she should start weight loss medication.    She reports irritable bowel syndrome and intermittent reflux.  She reports dietary modification helps control symptoms.  She reports if she eats steak she has cramping and diarrhea.    Past Medical History:   Diagnosis Date   • Anxiety    • Arthritis    • Bigeminy     bigeminy tachycardia   • Bipolar 1 disorder    • Cervical dysplasia    • Eczema    • History of cardiac radiofrequency ablation    • GOODWIN (nonalcoholic steatohepatitis)    • PCOS (polycystic ovarian syndrome)    • Plantar fasciitis    • Psoriatic arthritis    • Seborrheic keratosis         Past Surgical History:   Procedure Laterality Date   •  SECTION     • UPPER GASTROINTESTINAL ENDOSCOPY      [facility]        Current Outpatient Medications on File Prior to Visit  "  Medication Sig   • Etanercept (Enbrel SureClick) 50 MG/ML solution auto-injector Inject 1 mL under the skin into the appropriate area as directed. Waiting on insurance approval   • LORazepam (ATIVAN) 1 MG tablet 1 tablet As Needed.   • azelastine (ASTELIN) 0.1 % nasal spray Every 12 (Twelve) Hours.   • levocetirizine (XYZAL) 5 MG tablet Daily.     No current facility-administered medications on file prior to visit.       Allergies   Allergen Reactions   • Lidocaine Unknown - Low Severity   • Lamotrigine Palpitations   • Methylprednisolone Unknown (See Comments)   • Pseudoephedrine Palpitations       Family History   Problem Relation Age of Onset   • No Known Problems Mother    • No Known Problems Father    • Breast cancer Maternal Grandmother 47   • Ovarian cancer Neg Hx        Social History     Socioeconomic History   • Marital status:    Tobacco Use   • Smoking status: Former     Types: Cigarettes     Quit date:      Years since quittin.3   • Smokeless tobacco: Never   Vaping Use   • Vaping Use: Never used   Substance and Sexual Activity   • Alcohol use: Never   • Drug use: Never   • Sexual activity: Defer       PHYSICAL EXAM   /88 (BP Location: Left arm, Patient Position: Sitting, Cuff Size: Adult)   Pulse 75   Temp 97 °F (36.1 °C) (Infrared)   Ht 170.2 cm (67\")   Wt 126 kg (278 lb 6.4 oz)   SpO2 98%   BMI 43.60 kg/m²   Physical Exam  Constitutional:       General: She is not in acute distress.     Appearance: She is not toxic-appearing.   HENT:      Head: Normocephalic and atraumatic. No contusion.      Right Ear: External ear normal.      Left Ear: External ear normal.   Eyes:      General: Lids are normal. No scleral icterus.        Right eye: No discharge.         Left eye: No discharge.      Extraocular Movements: Extraocular movements intact.   Neck:      Trachea: Trachea normal.      Comments: No visible mass  No visible adenopathy  Cardiovascular:      Rate and Rhythm: " Normal rate.   Pulmonary:      Effort: No respiratory distress.      Comments: Symmetrical expansion    Musculoskeletal:      Right lower leg: No edema.      Left lower leg: No edema.      Comments: Symmetrical movement of upper extremities  Symmetrical movement of lower extremities  No visible deformities   Skin:     General: Skin is warm and dry.      Coloration: Skin is not jaundiced.   Neurological:      General: No focal deficit present.      Mental Status: She is alert and oriented to person, place, and time.   Psychiatric:         Mood and Affect: Mood normal.         Behavior: Behavior normal.         Thought Content: Thought content normal.     Results Review:  EGD 9/23/2020 per Dr. Perea due to reflux, left upper quadrant abdominal pain and chronic diarrhea appeared normal.  Recommendations to continue omeprazole every morning for reflux which she has improved, try amitriptyline 25 mg at bedtime for pain to see if helpful over the next month as it can help gastrointestinal and musculoskeletal pain.     12/13/2021 ultrasound of the abdomen Due to abdominal pain, nausea, vomiting revealed increased echogenicity throughout the hepatic parenchyma apart from focal fatty sparing, no focal liver lesion.     3/28/2022 CMP revealed normal liver enzymes.      7/2022 she had positive EDWARD 1-160 speckled pattern.    7/18/2022 CMP with normal ALT of 25, normal AST 18 and bilirubin less than 0.2, alkaline phosphatase normal 98.  Acute hepatitis panel negative     12/5/2022 TSH normal, sed rate normal 13, CMP with elevated  and elevated AST 64.  Bilirubin normal 0.3 alkaline phosphatase normal 90.  CBC normal white blood cell count 10.1, hemoglobin normal 13.3, hematocrit normal 40.7 and platelet count normal 306.     1/18/2023     - Tissue Transglutaminase, IgA less than 2  - GOODWIN Fibrosure F0 no fibrosis, S3 marked or severe steatosis, N1 borderline or probable GOODWIN  - Mitochondrial Antibodies negative  - Iron  Profile low iron and iron saturation, normal ferritin, normal CBC  - IgG, IgA, IgM normal  - Hepatitis B Surface Antibody reacitve, immune  - Hepatitis A Antibody negative needs vaccine  - Hemochromatosis Mutation C282Y, H63D, S65C not detected  - Ferritin 74.90  - Ceruloplasmin normal 27  - Anti-Smooth Muscle Antibody Titer weakly positive 24  - Anti-microsomal Antibody negative  - Alpha - 1 - Antitrypsin Phenotype total 162, phenotype MM     CMP        1/18/2023    09:02 2/22/2023    09:06 4/25/2023    08:48   CMP   Glucose 105   100   86     BUN 14   12   10     Creatinine 0.74   0.76   0.72     EGFR 108.4   104.3   111.3     Sodium 140   142   142     Potassium 4.1   4.1   3.9     Chloride 107   105   104     Calcium 9.1   9.5   9.2     Total Protein 7.3   7.9   7.5     Albumin 3.6   3.9   4.1     Globulin 3.7   4.0   3.4     Total Bilirubin <0.2   0.3   0.3     Alkaline Phosphatase 81   81   78     AST (SGOT) 66   48   25     ALT (SGPT) 85   60   32     Albumin/Globulin Ratio 1.0   1.0   1.2     BUN/Creatinine Ratio 18.9   15.8   13.9     Anion Gap 9.4   10.4   11.5       CBC        1/18/2023    09:02   CBC   WBC 5.45     RBC 4.43     Hemoglobin 13.0     Hematocrit 38.1     MCV 86.0     MCH 29.3     MCHC 34.1     RDW 13.3     Platelets 319         ASSESSMENT / PLAN  1. Abnormal laboratory test result  2. Class 3 severe obesity with serious comorbidity and body mass index (BMI) of 40.0 to 44.9 in adult, unspecified obesity type  - history of elevated liver enzymes suspected to be due to GOODWIN based off autoimmune, metabolic and viral work-up   with normal liver enzymes at weight around 275 pounds   -Check liver enzymes every 6 months to 1 year, return to the office if elevated liver enzymes in the future  - try to avoid weight gain, continued weight loss may be helpful.    - GOODWIN Fibrosure F0 no fibrosis, S3 marked or severe steatosis, N1 borderline or probable GOODWIN  -Anti-smooth muscle antibody weakly positive  but with normal alkaline phosphatase do not suspect primary biliary cholangitis at this time; this can be found in autoimmune hepatitis ( she has history of positive EDWARD) or chronic active hepatitis.  Suspect GOODWIN to be reason for elevated liver enzymes.   Avoid as many over-the-counter supplements as possible  -Work-up revealed low iron, low iron saturation; normal ferritin; normal hemoglobin and hematocrit, multivitamin with iron recommended which she is taking  -Limit use of ibuprofen, consider acetaminophen as needed for pain  -Continue to avoid alcohol  -  she takes Enbrel.  Prior Review of information regarding Enbrel on NIH liver tox reveals Enbrel has been linked to rare instances of acute, clinically apparent liver injury.  If any new or different medications are initiated, recommend close monitoring of liver enzymes either at our office or at office where medication is being prescribed (standing CMP order for once monthly due to possibly starting new medication)  3. Immune to hepatitis B  -She is not immune to hepatitis A.  We previously discussed hepatitis a vaccine but she did not want to proceed with hepatitis A vaccine at prior appointment recently starting Enbrel.  She may discuss hepatitis A vaccine at her primary care provider's office, local health department, local pharmacy or return to our office for hepatitis A vaccine in the future.  4. Irritable bowel syndrome, unspecified type  -Continue dietary modifications that seem helpful      Return if symptoms worsen or fail to improve.    Tamela Overton, APRN  05/19/2023

## 2023-08-30 ENCOUNTER — TRANSCRIBE ORDERS (OUTPATIENT)
Dept: LAB | Facility: HOSPITAL | Age: 38
End: 2023-08-30
Payer: COMMERCIAL

## 2023-08-30 ENCOUNTER — LAB (OUTPATIENT)
Dept: LAB | Facility: HOSPITAL | Age: 38
End: 2023-08-30
Payer: COMMERCIAL

## 2023-08-30 DIAGNOSIS — M54.9 DORSALGIA: ICD-10-CM

## 2023-08-30 DIAGNOSIS — L81.8 OTHER SPECIFIED DISORDERS OF PIGMENTATION: ICD-10-CM

## 2023-08-30 DIAGNOSIS — L40.50 ARTHROPATHIC PSORIASIS, UNSPECIFIED: ICD-10-CM

## 2023-08-30 DIAGNOSIS — L40.50 ARTHROPATHIC PSORIASIS, UNSPECIFIED: Primary | ICD-10-CM

## 2023-08-30 DIAGNOSIS — M79.18 MYALGIA, OTHER SITE: ICD-10-CM

## 2023-08-30 DIAGNOSIS — L40.9 PSORIASIS, UNSPECIFIED: ICD-10-CM

## 2023-08-30 LAB
ALBUMIN SERPL-MCNC: 4.2 G/DL (ref 3.5–5.2)
ALBUMIN/GLOB SERPL: 1.3 G/DL
ALP SERPL-CCNC: 75 U/L (ref 39–117)
ALT SERPL W P-5'-P-CCNC: 24 U/L (ref 1–33)
ANION GAP SERPL CALCULATED.3IONS-SCNC: 6 MMOL/L (ref 5–15)
AST SERPL-CCNC: 20 U/L (ref 1–32)
BASOPHILS # BLD AUTO: 0.04 10*3/MM3 (ref 0–0.2)
BASOPHILS NFR BLD AUTO: 0.6 % (ref 0–1.5)
BILIRUB SERPL-MCNC: 0.5 MG/DL (ref 0–1.2)
BUN SERPL-MCNC: 13 MG/DL (ref 6–20)
BUN/CREAT SERPL: 17.8 (ref 7–25)
CALCIUM SPEC-SCNC: 9 MG/DL (ref 8.6–10.5)
CHLORIDE SERPL-SCNC: 104 MMOL/L (ref 98–107)
CO2 SERPL-SCNC: 29 MMOL/L (ref 22–29)
CREAT SERPL-MCNC: 0.73 MG/DL (ref 0.57–1)
CRP SERPL-MCNC: <0.3 MG/DL (ref 0–0.5)
DEPRECATED RDW RBC AUTO: 42.5 FL (ref 37–54)
EGFRCR SERPLBLD CKD-EPI 2021: 109.5 ML/MIN/1.73
EOSINOPHIL # BLD AUTO: 0.11 10*3/MM3 (ref 0–0.4)
EOSINOPHIL NFR BLD AUTO: 1.7 % (ref 0.3–6.2)
ERYTHROCYTE [DISTWIDTH] IN BLOOD BY AUTOMATED COUNT: 13.2 % (ref 12.3–15.4)
ERYTHROCYTE [SEDIMENTATION RATE] IN BLOOD: 29 MM/HR (ref 0–20)
GLOBULIN UR ELPH-MCNC: 3.3 GM/DL
GLUCOSE SERPL-MCNC: 104 MG/DL (ref 65–99)
HCT VFR BLD AUTO: 40.2 % (ref 34–46.6)
HGB BLD-MCNC: 13.2 G/DL (ref 12–15.9)
IMM GRANULOCYTES # BLD AUTO: 0.02 10*3/MM3 (ref 0–0.05)
IMM GRANULOCYTES NFR BLD AUTO: 0.3 % (ref 0–0.5)
LYMPHOCYTES # BLD AUTO: 2.25 10*3/MM3 (ref 0.7–3.1)
LYMPHOCYTES NFR BLD AUTO: 34.8 % (ref 19.6–45.3)
MCH RBC QN AUTO: 28.8 PG (ref 26.6–33)
MCHC RBC AUTO-ENTMCNC: 32.8 G/DL (ref 31.5–35.7)
MCV RBC AUTO: 87.8 FL (ref 79–97)
MONOCYTES # BLD AUTO: 0.48 10*3/MM3 (ref 0.1–0.9)
MONOCYTES NFR BLD AUTO: 7.4 % (ref 5–12)
NEUTROPHILS NFR BLD AUTO: 3.57 10*3/MM3 (ref 1.7–7)
NEUTROPHILS NFR BLD AUTO: 55.2 % (ref 42.7–76)
NRBC BLD AUTO-RTO: 0 /100 WBC (ref 0–0.2)
PLATELET # BLD AUTO: 325 10*3/MM3 (ref 140–450)
PMV BLD AUTO: 10 FL (ref 6–12)
POTASSIUM SERPL-SCNC: 3.9 MMOL/L (ref 3.5–5.2)
PROT SERPL-MCNC: 7.5 G/DL (ref 6–8.5)
RBC # BLD AUTO: 4.58 10*6/MM3 (ref 3.77–5.28)
SODIUM SERPL-SCNC: 139 MMOL/L (ref 136–145)
WBC NRBC COR # BLD: 6.47 10*3/MM3 (ref 3.4–10.8)

## 2023-08-30 PROCEDURE — 86140 C-REACTIVE PROTEIN: CPT | Performed by: NURSE PRACTITIONER

## 2023-08-30 PROCEDURE — 85652 RBC SED RATE AUTOMATED: CPT | Performed by: NURSE PRACTITIONER

## 2023-08-30 PROCEDURE — 85025 COMPLETE CBC W/AUTO DIFF WBC: CPT | Performed by: NURSE PRACTITIONER

## 2023-08-30 PROCEDURE — 80053 COMPREHEN METABOLIC PANEL: CPT

## 2023-08-30 PROCEDURE — 36415 COLL VENOUS BLD VENIPUNCTURE: CPT | Performed by: NURSE PRACTITIONER

## 2024-06-18 ENCOUNTER — SPECIALTY PHARMACY (OUTPATIENT)
Age: 39
End: 2024-06-18
Payer: COMMERCIAL

## 2024-06-18 DIAGNOSIS — L40.50 PSORIATIC ARTHRITIS: Primary | ICD-10-CM

## 2024-07-23 RX ORDER — MEDROXYPROGESTERONE ACETATE 150 MG/ML
1 INJECTION, SUSPENSION INTRAMUSCULAR
Qty: 4.2 ML | Status: CANCELLED | OUTPATIENT
Start: 2024-07-23

## 2024-07-23 NOTE — TELEPHONE ENCOUNTER
PHARMACY IS CALLING IN A REFILL FOR PTS ETANERCEPT (ENBREL SURECLICK) 50MG/ML SOLUTION AUTO INJECTOR.     PHARMACY ON FILE.     PHARMACY PHONE   [phone]    PHARMACY FAX  [phone]

## 2024-07-23 NOTE — TELEPHONE ENCOUNTER
I CALLED PT TO SCHEDULE AN APPT TO GET HER MEDS REFILLED BUT SHE IS NOW GOING TO [facility] AND DOES NOT NEED THIS REFILL.